# Patient Record
Sex: FEMALE | Race: BLACK OR AFRICAN AMERICAN | Employment: FULL TIME | URBAN - METROPOLITAN AREA
[De-identification: names, ages, dates, MRNs, and addresses within clinical notes are randomized per-mention and may not be internally consistent; named-entity substitution may affect disease eponyms.]

---

## 2019-02-11 ENCOUNTER — HOSPITAL ENCOUNTER (EMERGENCY)
Facility: CLINIC | Age: 49
Discharge: HOME | End: 2019-02-11
Attending: EMERGENCY MEDICINE | Admitting: EMERGENCY MEDICINE

## 2019-02-11 VITALS
WEIGHT: 198 LBS | HEART RATE: 69 BPM | BODY MASS INDEX: 35.08 KG/M2 | TEMPERATURE: 98.7 F | OXYGEN SATURATION: 97 % | RESPIRATION RATE: 16 BRPM | SYSTOLIC BLOOD PRESSURE: 104 MMHG | HEIGHT: 63 IN | DIASTOLIC BLOOD PRESSURE: 61 MMHG

## 2019-02-11 DIAGNOSIS — R51.9 NONINTRACTABLE HEADACHE, UNSPECIFIED CHRONICITY PATTERN, UNSPECIFIED HEADACHE TYPE: ICD-10-CM

## 2019-02-11 PROCEDURE — 96361 HYDRATE IV INFUSION ADD-ON: CPT

## 2019-02-11 PROCEDURE — 25000132 ZZH RX MED GY IP 250 OP 250 PS 637: Performed by: EMERGENCY MEDICINE

## 2019-02-11 PROCEDURE — 25000128 H RX IP 250 OP 636: Performed by: EMERGENCY MEDICINE

## 2019-02-11 PROCEDURE — 96365 THER/PROPH/DIAG IV INF INIT: CPT

## 2019-02-11 PROCEDURE — 96375 TX/PRO/DX INJ NEW DRUG ADDON: CPT

## 2019-02-11 PROCEDURE — 99284 EMERGENCY DEPT VISIT MOD MDM: CPT | Mod: 25

## 2019-02-11 RX ORDER — MAGNESIUM SULFATE HEPTAHYDRATE 40 MG/ML
2 INJECTION, SOLUTION INTRAVENOUS ONCE
Status: COMPLETED | OUTPATIENT
Start: 2019-02-11 | End: 2019-02-11

## 2019-02-11 RX ORDER — DEXAMETHASONE SODIUM PHOSPHATE 10 MG/ML
10 INJECTION, SOLUTION INTRAMUSCULAR; INTRAVENOUS ONCE
Status: COMPLETED | OUTPATIENT
Start: 2019-02-11 | End: 2019-02-11

## 2019-02-11 RX ORDER — MAGNESIUM SULFATE HEPTAHYDRATE 500 MG/ML
2 INJECTION, SOLUTION INTRAMUSCULAR; INTRAVENOUS ONCE
Status: DISCONTINUED | OUTPATIENT
Start: 2019-02-11 | End: 2019-02-11

## 2019-02-11 RX ORDER — KETOROLAC TROMETHAMINE 15 MG/ML
15 INJECTION, SOLUTION INTRAMUSCULAR; INTRAVENOUS ONCE
Status: COMPLETED | OUTPATIENT
Start: 2019-02-11 | End: 2019-02-11

## 2019-02-11 RX ORDER — ACETAMINOPHEN 500 MG
1000 TABLET ORAL ONCE
Status: COMPLETED | OUTPATIENT
Start: 2019-02-11 | End: 2019-02-11

## 2019-02-11 RX ORDER — LEVOTHYROXINE SODIUM 100 UG/1
TABLET ORAL DAILY
COMMUNITY
End: 2019-12-05

## 2019-02-11 RX ORDER — METOCLOPRAMIDE HYDROCHLORIDE 5 MG/ML
10 INJECTION INTRAMUSCULAR; INTRAVENOUS ONCE
Status: COMPLETED | OUTPATIENT
Start: 2019-02-11 | End: 2019-02-11

## 2019-02-11 RX ORDER — DIPHENHYDRAMINE HYDROCHLORIDE 50 MG/ML
25 INJECTION INTRAMUSCULAR; INTRAVENOUS ONCE
Status: COMPLETED | OUTPATIENT
Start: 2019-02-11 | End: 2019-02-11

## 2019-02-11 RX ADMIN — DEXAMETHASONE SODIUM PHOSPHATE 10 MG: 10 INJECTION, SOLUTION INTRAMUSCULAR; INTRAVENOUS at 09:34

## 2019-02-11 RX ADMIN — KETOROLAC TROMETHAMINE 15 MG: 15 INJECTION, SOLUTION INTRAMUSCULAR; INTRAVENOUS at 09:32

## 2019-02-11 RX ADMIN — MAGNESIUM SULFATE HEPTAHYDRATE 2 G: 40 INJECTION, SOLUTION INTRAVENOUS at 09:51

## 2019-02-11 RX ADMIN — METOCLOPRAMIDE 10 MG: 5 INJECTION, SOLUTION INTRAMUSCULAR; INTRAVENOUS at 09:35

## 2019-02-11 RX ADMIN — SODIUM CHLORIDE 1000 ML: 9 INJECTION, SOLUTION INTRAVENOUS at 09:04

## 2019-02-11 RX ADMIN — ACETAMINOPHEN 1000 MG: 500 TABLET, FILM COATED ORAL at 09:37

## 2019-02-11 RX ADMIN — DIPHENHYDRAMINE HYDROCHLORIDE 25 MG: 50 INJECTION, SOLUTION INTRAMUSCULAR; INTRAVENOUS at 09:33

## 2019-02-11 ASSESSMENT — MIFFLIN-ST. JEOR: SCORE: 1502.25

## 2019-02-11 ASSESSMENT — ENCOUNTER SYMPTOMS
NAUSEA: 1
HEADACHES: 1

## 2019-02-11 NOTE — ED AVS SNAPSHOT
Emergency Department  [address] [place] [address]  Phone:  [phone]  Fax:  [phone]                                    Raya Mccormick   MRN: [id number]    Department:   Emergency Department   Date of Visit:  2/11/2019           After Visit Summary Signature Page    I have received my discharge instructions, and my questions have been answered. I have discussed any challenges I see with this plan with the nurse or doctor.    ..........................................................................................................................................  Patient/Patient Representative Signature      ..........................................................................................................................................  Patient Representative Print Name and Relationship to Patient    ..................................................               ................................................  Date                                   Time    ..........................................................................................................................................  Reviewed by Signature/Title    ...................................................              ..............................................  Date                                               Time          22EPIC Rev 08/18

## 2019-02-11 NOTE — ED NOTES
Bed: ED21  Expected date: 2/11/19  Expected time: 8:25 AM  Means of arrival: Ambulance  Comments:  Alejandro 49f migraine ETA 0023

## 2019-02-11 NOTE — ED PROVIDER NOTES
"  History     Chief Complaint:  Headache    HPI   Raya Mccormick is a 49 year old female with a history of diabetes who presents to the emergency department for evaluation of a headache. The patient reports that she has had a headache for two weeks with mild nausea. She tried aspirin with no improvement so she took a dose of Imitrex from a friend and then felt worse which prompted her evaluation to the ED. She has no other concerns.     Allergies:  Metronidazole  Morphine     Medications:    Levothyroxine  Linzess  Metformin    Past Medical History:    Celiac disease  Diabetes  Endometriosis  Depression  PTSD  GERD  Stress incontinence  Thyroid nodule    Past Surgical History:    Cholecystectomy  Colon surgery  Colonoscopy  Hysterectomy  Schatzki's ring    Family History:    Substance abuse    Social History:  Marital Status:   [2]  Negative for tobacco use.  Alcohol use: yes  Negative for drug use.     Review of Systems   Gastrointestinal: Positive for nausea.   Neurological: Positive for headaches.   All other systems reviewed and are negative.      Physical Exam     Patient Vitals for the past 24 hrs:   BP Temp Temp src Pulse Resp SpO2 Height Weight   02/11/19 1200 104/61 -- -- 69 -- 97 % -- --   02/11/19 1120 101/55 -- -- 68 -- 97 % -- --   02/11/19 1052 -- -- -- -- -- 96 % -- --   02/11/19 1020 137/87 -- -- 66 -- 97 % -- --   02/11/19 0952 145/88 -- -- 78 -- 100 % -- --   02/11/19 0852 (!) 142/94 98.7  F (37.1  C) Oral 71 16 97 % 1.6 m (5' 3\") 89.8 kg (198 lb)     Physical Exam  Vitals: reviewed by me  General: Pt seen on [facility], cooperative, and alert to conversation  Eyes: Tracking well, clear conjunctiva BL  ENT: MMM, midline trachea.  Full range of motion to neck.  Lungs:   No tachypnea, no accessory muscle use. No respiratory distress.   CV: Rate as above, regular rhythm.    Abd: Soft, non tender, no guarding, no rebound. Non distended  MSK: no peripheral edema or joint " effusion.  No evidence of trauma  Skin: No rash, normal turgor and temperature  Neuro: Clear speech and no facial droop.  Psych: Not RIS, no e/o AH/      Emergency Department Course   Interventions:  0904 0.9% Sodium Chloride BOLUS 1000 mLs IV   0932 Toradol 15 mg IV  0933 Benadryl 25 mg IV  0934 Decadron 10 mg IV  0935 Reglan 10 mg IV  0937 Tylenol 1000 mg tablet PO  0951 Magnesium sulfate 2 g IV    Emergency Department Course:  0920 Nursing notes and vitals reviewed. I performed an exam of the patient as documented above.     IV inserted. Medicine administered as documented above.     1133 I rechecked the patient and discussed the results of her workup thus far.     Findings and plan explained to the Patient. Patient discharged home with instructions regarding supportive care, medications, and reasons to return. The importance of close follow-up was reviewed.     Impression & Plan    Medical Decision Making:  Raya Mccormick is a 49 year old female who presents to the ED for a headache for two weeks. Non thunder clap in origin, waxing and waning, does seem to be more like a migraine, and she is quite nauseous as well. She tried Imitrex from a friend, this did not help. Here in the ER, she is doing quite well with standard migraine, non narcotic therapy. She has a normal neurologic exam, acting normally per her friend at bedside, and I do think she'll do well in the outpatient setting. Will discharge with very clear return to ED precautions and primary care follow up. No evidence of meningitis, no evidence of glaucoma, or any other evidence of emergent cranial abnormality.     Diagnosis:    ICD-10-CM    1. Nonintractable headache, unspecified chronicity pattern, unspecified headache type R51        Disposition:  Discharged to home    Scribe Disclosure:  KIMBERLYN, Richard Toro, am serving as a scribe on 2/11/2019 at 9:20 AM to personally document services performed by Dr. Hardik MD based on my observations and  the provider's statements to me.     Richard Toro  2/11/2019    EMERGENCY DEPARTMENT       Hill Dye MD  02/11/19 9040

## 2019-02-11 NOTE — LETTER
February 11, 2019      To Whom It May Concern:      Raya Mccormick was seen in our Emergency Department today, 02/11/19.  I expect her condition to improve over the next  2 days.  Her brother was here with her during this visit as she was unable to  herself home.     Sincerely,        Tyra Hernandez RN

## 2019-02-24 ENCOUNTER — HOSPITAL ENCOUNTER (EMERGENCY)
Facility: CLINIC | Age: 49
Discharge: HOME | End: 2019-02-24
Attending: EMERGENCY MEDICINE | Admitting: EMERGENCY MEDICINE

## 2019-02-24 ENCOUNTER — APPOINTMENT (OUTPATIENT)
Dept: CT IMAGING | Facility: CLINIC | Age: 49
End: 2019-02-24
Attending: EMERGENCY MEDICINE

## 2019-02-24 ENCOUNTER — APPOINTMENT (OUTPATIENT)
Dept: GENERAL RADIOLOGY | Facility: CLINIC | Age: 49
End: 2019-02-24
Attending: EMERGENCY MEDICINE

## 2019-02-24 VITALS
BODY MASS INDEX: 35.43 KG/M2 | HEART RATE: 70 BPM | WEIGHT: 200 LBS | DIASTOLIC BLOOD PRESSURE: 83 MMHG | TEMPERATURE: 97.6 F | OXYGEN SATURATION: 99 % | SYSTOLIC BLOOD PRESSURE: 126 MMHG | RESPIRATION RATE: 16 BRPM

## 2019-02-24 DIAGNOSIS — M62.838 SPASM OF MUSCLE: ICD-10-CM

## 2019-02-24 LAB
ALBUMIN SERPL-MCNC: 3.1 G/DL (ref 3.4–5)
ALP SERPL-CCNC: 92 U/L (ref 40–150)
ALT SERPL W P-5'-P-CCNC: 39 U/L (ref 0–50)
ANION GAP SERPL CALCULATED.3IONS-SCNC: 8 MMOL/L (ref 3–14)
AST SERPL W P-5'-P-CCNC: 26 U/L (ref 0–45)
BASOPHILS # BLD AUTO: 0 10E9/L (ref 0–0.2)
BASOPHILS NFR BLD AUTO: 0.2 %
BILIRUB SERPL-MCNC: 0.2 MG/DL (ref 0.2–1.3)
BUN SERPL-MCNC: 11 MG/DL (ref 7–30)
CALCIUM SERPL-MCNC: 8.5 MG/DL (ref 8.5–10.1)
CHLORIDE SERPL-SCNC: 105 MMOL/L (ref 94–109)
CO2 SERPL-SCNC: 30 MMOL/L (ref 20–32)
CREAT SERPL-MCNC: 0.8 MG/DL (ref 0.52–1.04)
DIFFERENTIAL METHOD BLD: NORMAL
EOSINOPHIL # BLD AUTO: 0.2 10E9/L (ref 0–0.7)
EOSINOPHIL NFR BLD AUTO: 2.7 %
ERYTHROCYTE [DISTWIDTH] IN BLOOD BY AUTOMATED COUNT: 14.4 % (ref 10–15)
GFR SERPL CREATININE-BSD FRML MDRD: 88 ML/MIN/{1.73_M2}
GLUCOSE SERPL-MCNC: 91 MG/DL (ref 70–99)
HCT VFR BLD AUTO: 40.1 % (ref 35–47)
HGB BLD-MCNC: 13.9 G/DL (ref 11.7–15.7)
IMM GRANULOCYTES # BLD: 0 10E9/L (ref 0–0.4)
IMM GRANULOCYTES NFR BLD: 0.2 %
LYMPHOCYTES # BLD AUTO: 3 10E9/L (ref 0.8–5.3)
LYMPHOCYTES NFR BLD AUTO: 51.5 %
MCH RBC QN AUTO: 28.3 PG (ref 26.5–33)
MCHC RBC AUTO-ENTMCNC: 34.7 G/DL (ref 31.5–36.5)
MCV RBC AUTO: 82 FL (ref 78–100)
MONOCYTES # BLD AUTO: 0.3 10E9/L (ref 0–1.3)
MONOCYTES NFR BLD AUTO: 5.3 %
NEUTROPHILS # BLD AUTO: 2.3 10E9/L (ref 1.6–8.3)
NEUTROPHILS NFR BLD AUTO: 40.1 %
NRBC # BLD AUTO: 0 10*3/UL
NRBC BLD AUTO-RTO: 0 /100
PLATELET # BLD AUTO: 207 10E9/L (ref 150–450)
POTASSIUM SERPL-SCNC: 4 MMOL/L (ref 3.4–5.3)
PROT SERPL-MCNC: 7 G/DL (ref 6.8–8.8)
RBC # BLD AUTO: 4.92 10E12/L (ref 3.8–5.2)
SODIUM SERPL-SCNC: 143 MMOL/L (ref 133–144)
TROPONIN I SERPL-MCNC: <0.015 UG/L (ref 0–0.04)
WBC # BLD AUTO: 5.8 10E9/L (ref 4–11)

## 2019-02-24 PROCEDURE — 93005 ELECTROCARDIOGRAM TRACING: CPT | Performed by: EMERGENCY MEDICINE

## 2019-02-24 PROCEDURE — 84484 ASSAY OF TROPONIN QUANT: CPT | Performed by: EMERGENCY MEDICINE

## 2019-02-24 PROCEDURE — 25000132 ZZH RX MED GY IP 250 OP 250 PS 637: Performed by: EMERGENCY MEDICINE

## 2019-02-24 PROCEDURE — 70450 CT HEAD/BRAIN W/O DYE: CPT

## 2019-02-24 PROCEDURE — 25000128 H RX IP 250 OP 636: Performed by: EMERGENCY MEDICINE

## 2019-02-24 PROCEDURE — 71045 X-RAY EXAM CHEST 1 VIEW: CPT

## 2019-02-24 PROCEDURE — 96372 THER/PROPH/DIAG INJ SC/IM: CPT | Performed by: EMERGENCY MEDICINE

## 2019-02-24 PROCEDURE — 80053 COMPREHEN METABOLIC PANEL: CPT | Performed by: EMERGENCY MEDICINE

## 2019-02-24 PROCEDURE — 93010 ELECTROCARDIOGRAM REPORT: CPT | Mod: Z6 | Performed by: EMERGENCY MEDICINE

## 2019-02-24 PROCEDURE — 36415 COLL VENOUS BLD VENIPUNCTURE: CPT | Performed by: EMERGENCY MEDICINE

## 2019-02-24 PROCEDURE — 99285 EMERGENCY DEPT VISIT HI MDM: CPT | Mod: 25 | Performed by: EMERGENCY MEDICINE

## 2019-02-24 PROCEDURE — 85025 COMPLETE CBC W/AUTO DIFF WBC: CPT | Performed by: EMERGENCY MEDICINE

## 2019-02-24 RX ORDER — CYCLOBENZAPRINE HCL 10 MG
10 TABLET ORAL ONCE
Status: COMPLETED | OUTPATIENT
Start: 2019-02-24 | End: 2019-02-24

## 2019-02-24 RX ORDER — KETOROLAC TROMETHAMINE 30 MG/ML
30 INJECTION, SOLUTION INTRAMUSCULAR; INTRAVENOUS ONCE
Status: COMPLETED | OUTPATIENT
Start: 2019-02-24 | End: 2019-02-24

## 2019-02-24 RX ORDER — KETOROLAC TROMETHAMINE 30 MG/ML
30 INJECTION, SOLUTION INTRAMUSCULAR; INTRAVENOUS ONCE
Status: DISCONTINUED | OUTPATIENT
Start: 2019-02-24 | End: 2019-02-24

## 2019-02-24 RX ORDER — ACETAMINOPHEN AND CODEINE PHOSPHATE 300; 30 MG/1; MG/1
1-2 TABLET ORAL EVERY 6 HOURS PRN
Qty: 12 TABLET | Refills: 0 | Status: SHIPPED | OUTPATIENT
Start: 2019-02-24 | End: 2019-03-26

## 2019-02-24 RX ADMIN — CYCLOBENZAPRINE HYDROCHLORIDE 10 MG: 10 TABLET, FILM COATED ORAL at 18:37

## 2019-02-24 RX ADMIN — ACETAMINOPHEN AND CODEINE PHOSPHATE 1 TABLET: 300; 30 TABLET ORAL at 19:53

## 2019-02-24 RX ADMIN — KETOROLAC TROMETHAMINE 30 MG: 30 INJECTION, SOLUTION INTRAMUSCULAR at 18:39

## 2019-02-24 ASSESSMENT — ENCOUNTER SYMPTOMS: HEADACHES: 1

## 2019-02-24 NOTE — ED AVS SNAPSHOT
[facility], [place], Emergency Department  [address]  [facility] [address]  Phone:  [phone]  Fax:  [phone]                                    Raya Mccormick   MRN: [id number]    Department:  [facility], Emergency Department   Date of Visit:  2/24/2019           After Visit Summary Signature Page    I have received my discharge instructions, and my questions have been answered. I have discussed any challenges I see with this plan with the nurse or doctor.    ..........................................................................................................................................  Patient/Patient Representative Signature      ..........................................................................................................................................  Patient Representative Print Name and Relationship to Patient    ..................................................               ................................................  Date                                   Time    ..........................................................................................................................................  Reviewed by Signature/Title    ...................................................              ..............................................  Date                                               Time          22EPIC Rev 08/18

## 2019-02-25 LAB — INTERPRETATION ECG - MUSE: NORMAL

## 2019-02-25 NOTE — DISCHARGE INSTRUCTIONS
See your doctor in 2 days.  Return here if chest pain, weakness, numbness, increased headache, visual changes.
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
## 2019-12-05 ENCOUNTER — APPOINTMENT (OUTPATIENT)
Dept: GENERAL RADIOLOGY | Facility: CLINIC | Age: 49
End: 2019-12-05
Attending: EMERGENCY MEDICINE

## 2019-12-05 ENCOUNTER — HOSPITAL ENCOUNTER (OUTPATIENT)
Facility: CLINIC | Age: 49
Setting detail: OBSERVATION
Discharge: HOME | End: 2019-12-06
Attending: EMERGENCY MEDICINE | Admitting: HOSPITALIST

## 2019-12-05 ENCOUNTER — APPOINTMENT (OUTPATIENT)
Dept: CT IMAGING | Facility: CLINIC | Age: 49
End: 2019-12-05
Attending: HOSPITALIST

## 2019-12-05 DIAGNOSIS — R07.1 CHEST PAIN ON BREATHING: ICD-10-CM

## 2019-12-05 DIAGNOSIS — M79.18 MUSCULOSKELETAL PAIN: Primary | ICD-10-CM

## 2019-12-05 DIAGNOSIS — F33.2 SEVERE EPISODE OF RECURRENT MAJOR DEPRESSIVE DISORDER, WITHOUT PSYCHOTIC FEATURES (H): ICD-10-CM

## 2019-12-05 DIAGNOSIS — R09.1 PLEURISY: ICD-10-CM

## 2019-12-05 PROBLEM — R52 PAIN: Status: ACTIVE | Noted: 2019-12-05

## 2019-12-05 LAB
ALBUMIN SERPL-MCNC: 3.6 G/DL (ref 3.4–5)
ALBUMIN UR-MCNC: 10 MG/DL
ALP SERPL-CCNC: 83 U/L (ref 40–150)
ALT SERPL W P-5'-P-CCNC: 30 U/L (ref 0–50)
ANION GAP SERPL CALCULATED.3IONS-SCNC: 2 MMOL/L (ref 3–14)
APPEARANCE UR: CLEAR
AST SERPL W P-5'-P-CCNC: 22 U/L (ref 0–45)
BASOPHILS # BLD AUTO: 0 10E9/L (ref 0–0.2)
BASOPHILS NFR BLD AUTO: 0.2 %
BILIRUB SERPL-MCNC: 0.3 MG/DL (ref 0.2–1.3)
BILIRUB UR QL STRIP: NEGATIVE
BUN SERPL-MCNC: 12 MG/DL (ref 7–30)
CALCIUM SERPL-MCNC: 9.1 MG/DL (ref 8.5–10.1)
CHLORIDE SERPL-SCNC: 105 MMOL/L (ref 94–109)
CO2 SERPL-SCNC: 31 MMOL/L (ref 20–32)
COLOR UR AUTO: YELLOW
CREAT SERPL-MCNC: 0.86 MG/DL (ref 0.52–1.04)
D DIMER PPP FEU-MCNC: 0.3 UG/ML FEU (ref 0–0.5)
DIFFERENTIAL METHOD BLD: NORMAL
EOSINOPHIL # BLD AUTO: 0.2 10E9/L (ref 0–0.7)
EOSINOPHIL NFR BLD AUTO: 2.5 %
ERYTHROCYTE [DISTWIDTH] IN BLOOD BY AUTOMATED COUNT: 13.8 % (ref 10–15)
GFR SERPL CREATININE-BSD FRML MDRD: 80 ML/MIN/{1.73_M2}
GLUCOSE BLDC GLUCOMTR-MCNC: 84 MG/DL (ref 70–99)
GLUCOSE SERPL-MCNC: 96 MG/DL (ref 70–99)
GLUCOSE UR STRIP-MCNC: NEGATIVE MG/DL
HCT VFR BLD AUTO: 40.5 % (ref 35–47)
HGB BLD-MCNC: 14.3 G/DL (ref 11.7–15.7)
HGB UR QL STRIP: NEGATIVE
IMM GRANULOCYTES # BLD: 0 10E9/L (ref 0–0.4)
IMM GRANULOCYTES NFR BLD: 0.2 %
INTERPRETATION ECG - MUSE: NORMAL
KETONES UR STRIP-MCNC: 5 MG/DL
LEUKOCYTE ESTERASE UR QL STRIP: NEGATIVE
LIPASE SERPL-CCNC: 240 U/L (ref 73–393)
LYMPHOCYTES # BLD AUTO: 3 10E9/L (ref 0.8–5.3)
LYMPHOCYTES NFR BLD AUTO: 50 %
MCH RBC QN AUTO: 28.3 PG (ref 26.5–33)
MCHC RBC AUTO-ENTMCNC: 35.3 G/DL (ref 31.5–36.5)
MCV RBC AUTO: 80 FL (ref 78–100)
MONOCYTES # BLD AUTO: 0.4 10E9/L (ref 0–1.3)
MONOCYTES NFR BLD AUTO: 7.1 %
MUCOUS THREADS #/AREA URNS LPF: PRESENT /LPF
NEUTROPHILS # BLD AUTO: 2.4 10E9/L (ref 1.6–8.3)
NEUTROPHILS NFR BLD AUTO: 40 %
NITRATE UR QL: NEGATIVE
NRBC # BLD AUTO: 0 10*3/UL
NRBC BLD AUTO-RTO: 0 /100
PH UR STRIP: 6 PH (ref 5–7)
PLATELET # BLD AUTO: 221 10E9/L (ref 150–450)
POTASSIUM SERPL-SCNC: 4.1 MMOL/L (ref 3.4–5.3)
PROT SERPL-MCNC: 6.9 G/DL (ref 6.8–8.8)
RBC # BLD AUTO: 5.05 10E12/L (ref 3.8–5.2)
RBC #/AREA URNS AUTO: 3 /HPF (ref 0–2)
SODIUM SERPL-SCNC: 138 MMOL/L (ref 133–144)
SOURCE: ABNORMAL
SP GR UR STRIP: 1.03 (ref 1–1.03)
SQUAMOUS #/AREA URNS AUTO: <1 /HPF (ref 0–1)
TROPONIN I SERPL-MCNC: <0.015 UG/L (ref 0–0.04)
TSH SERPL DL<=0.005 MIU/L-ACNC: 2.06 MU/L (ref 0.4–4)
UROBILINOGEN UR STRIP-MCNC: NORMAL MG/DL (ref 0–2)
WBC # BLD AUTO: 5.9 10E9/L (ref 4–11)
WBC #/AREA URNS AUTO: 1 /HPF (ref 0–5)

## 2019-12-05 PROCEDURE — 25000128 H RX IP 250 OP 636: Performed by: PHYSICIAN ASSISTANT

## 2019-12-05 PROCEDURE — 80053 COMPREHEN METABOLIC PANEL: CPT | Performed by: EMERGENCY MEDICINE

## 2019-12-05 PROCEDURE — 36415 COLL VENOUS BLD VENIPUNCTURE: CPT | Performed by: HOSPITALIST

## 2019-12-05 PROCEDURE — 83690 ASSAY OF LIPASE: CPT | Performed by: EMERGENCY MEDICINE

## 2019-12-05 PROCEDURE — 93005 ELECTROCARDIOGRAM TRACING: CPT

## 2019-12-05 PROCEDURE — 85025 COMPLETE CBC W/AUTO DIFF WBC: CPT | Performed by: EMERGENCY MEDICINE

## 2019-12-05 PROCEDURE — 00000146 ZZHCL STATISTIC GLUCOSE BY METER IP

## 2019-12-05 PROCEDURE — 96375 TX/PRO/DX INJ NEW DRUG ADDON: CPT

## 2019-12-05 PROCEDURE — 99220 ZZC INITIAL OBSERVATION CARE,LEVL III: CPT | Performed by: HOSPITALIST

## 2019-12-05 PROCEDURE — 25000128 H RX IP 250 OP 636: Performed by: HOSPITALIST

## 2019-12-05 PROCEDURE — G0378 HOSPITAL OBSERVATION PER HR: HCPCS

## 2019-12-05 PROCEDURE — 84484 ASSAY OF TROPONIN QUANT: CPT | Performed by: EMERGENCY MEDICINE

## 2019-12-05 PROCEDURE — 96361 HYDRATE IV INFUSION ADD-ON: CPT

## 2019-12-05 PROCEDURE — 84443 ASSAY THYROID STIM HORMONE: CPT | Performed by: EMERGENCY MEDICINE

## 2019-12-05 PROCEDURE — 81001 URINALYSIS AUTO W/SCOPE: CPT | Performed by: HOSPITALIST

## 2019-12-05 PROCEDURE — 96374 THER/PROPH/DIAG INJ IV PUSH: CPT

## 2019-12-05 PROCEDURE — 71046 X-RAY EXAM CHEST 2 VIEWS: CPT

## 2019-12-05 PROCEDURE — 96376 TX/PRO/DX INJ SAME DRUG ADON: CPT

## 2019-12-05 PROCEDURE — 25800030 ZZH RX IP 258 OP 636: Performed by: EMERGENCY MEDICINE

## 2019-12-05 PROCEDURE — 25000128 H RX IP 250 OP 636: Performed by: EMERGENCY MEDICINE

## 2019-12-05 PROCEDURE — 36415 COLL VENOUS BLD VENIPUNCTURE: CPT | Performed by: EMERGENCY MEDICINE

## 2019-12-05 PROCEDURE — 74176 CT ABD & PELVIS W/O CONTRAST: CPT

## 2019-12-05 PROCEDURE — 85379 FIBRIN DEGRADATION QUANT: CPT | Performed by: EMERGENCY MEDICINE

## 2019-12-05 PROCEDURE — 25000132 ZZH RX MED GY IP 250 OP 250 PS 637: Performed by: HOSPITALIST

## 2019-12-05 PROCEDURE — 99285 EMERGENCY DEPT VISIT HI MDM: CPT | Mod: 25

## 2019-12-05 PROCEDURE — 84484 ASSAY OF TROPONIN QUANT: CPT | Performed by: HOSPITALIST

## 2019-12-05 RX ORDER — ONDANSETRON 4 MG/1
4 TABLET, ORALLY DISINTEGRATING ORAL EVERY 6 HOURS PRN
Status: DISCONTINUED | OUTPATIENT
Start: 2019-12-05 | End: 2019-12-06 | Stop reason: HOSPADM

## 2019-12-05 RX ORDER — ONDANSETRON 2 MG/ML
4 INJECTION INTRAMUSCULAR; INTRAVENOUS EVERY 30 MIN PRN
Status: DISCONTINUED | OUTPATIENT
Start: 2019-12-05 | End: 2019-12-05

## 2019-12-05 RX ORDER — ACETAMINOPHEN 650 MG/1
650 SUPPOSITORY RECTAL EVERY 4 HOURS PRN
Status: DISCONTINUED | OUTPATIENT
Start: 2019-12-05 | End: 2019-12-06 | Stop reason: HOSPADM

## 2019-12-05 RX ORDER — IBUPROFEN 600 MG/1
600 TABLET, FILM COATED ORAL EVERY 6 HOURS PRN
Status: DISCONTINUED | OUTPATIENT
Start: 2019-12-05 | End: 2019-12-06

## 2019-12-05 RX ORDER — NALOXONE HYDROCHLORIDE 0.4 MG/ML
.1-.4 INJECTION, SOLUTION INTRAMUSCULAR; INTRAVENOUS; SUBCUTANEOUS
Status: DISCONTINUED | OUTPATIENT
Start: 2019-12-05 | End: 2019-12-06 | Stop reason: HOSPADM

## 2019-12-05 RX ORDER — DEXTROSE MONOHYDRATE 25 G/50ML
25-50 INJECTION, SOLUTION INTRAVENOUS
Status: DISCONTINUED | OUTPATIENT
Start: 2019-12-05 | End: 2019-12-06 | Stop reason: HOSPADM

## 2019-12-05 RX ORDER — KETOROLAC TROMETHAMINE 30 MG/ML
30 INJECTION, SOLUTION INTRAMUSCULAR; INTRAVENOUS ONCE
Status: COMPLETED | OUTPATIENT
Start: 2019-12-05 | End: 2019-12-05

## 2019-12-05 RX ORDER — HYDROMORPHONE HYDROCHLORIDE 1 MG/ML
.3-.5 INJECTION, SOLUTION INTRAMUSCULAR; INTRAVENOUS; SUBCUTANEOUS
Status: DISCONTINUED | OUTPATIENT
Start: 2019-12-05 | End: 2019-12-06 | Stop reason: HOSPADM

## 2019-12-05 RX ORDER — METFORMIN HCL 500 MG
1000 TABLET, EXTENDED RELEASE 24 HR ORAL
COMMUNITY

## 2019-12-05 RX ORDER — HYDROMORPHONE HYDROCHLORIDE 1 MG/ML
0.5 INJECTION, SOLUTION INTRAMUSCULAR; INTRAVENOUS; SUBCUTANEOUS ONCE
Status: COMPLETED | OUTPATIENT
Start: 2019-12-05 | End: 2019-12-05

## 2019-12-05 RX ORDER — LIDOCAINE 40 MG/G
CREAM TOPICAL
Status: DISCONTINUED | OUTPATIENT
Start: 2019-12-05 | End: 2019-12-06 | Stop reason: HOSPADM

## 2019-12-05 RX ORDER — IBUPROFEN 600 MG/1
600 TABLET, FILM COATED ORAL EVERY 6 HOURS PRN
Status: DISCONTINUED | OUTPATIENT
Start: 2019-12-05 | End: 2019-12-06 | Stop reason: HOSPADM

## 2019-12-05 RX ORDER — KETOROLAC TROMETHAMINE 30 MG/ML
30 INJECTION, SOLUTION INTRAMUSCULAR; INTRAVENOUS EVERY 6 HOURS PRN
Status: DISCONTINUED | OUTPATIENT
Start: 2019-12-05 | End: 2019-12-06 | Stop reason: HOSPADM

## 2019-12-05 RX ORDER — NICOTINE POLACRILEX 4 MG
15-30 LOZENGE BUCCAL
Status: DISCONTINUED | OUTPATIENT
Start: 2019-12-05 | End: 2019-12-06 | Stop reason: HOSPADM

## 2019-12-05 RX ORDER — ONDANSETRON 2 MG/ML
4 INJECTION INTRAMUSCULAR; INTRAVENOUS EVERY 6 HOURS PRN
Status: DISCONTINUED | OUTPATIENT
Start: 2019-12-05 | End: 2019-12-06 | Stop reason: HOSPADM

## 2019-12-05 RX ORDER — HYDROMORPHONE HYDROCHLORIDE 1 MG/ML
0.5 INJECTION, SOLUTION INTRAMUSCULAR; INTRAVENOUS; SUBCUTANEOUS
Status: COMPLETED | OUTPATIENT
Start: 2019-12-05 | End: 2019-12-05

## 2019-12-05 RX ORDER — ACETAMINOPHEN 325 MG/1
650 TABLET ORAL EVERY 4 HOURS PRN
Status: DISCONTINUED | OUTPATIENT
Start: 2019-12-05 | End: 2019-12-06 | Stop reason: HOSPADM

## 2019-12-05 RX ORDER — HYDROMORPHONE HYDROCHLORIDE 1 MG/ML
0.5 INJECTION, SOLUTION INTRAMUSCULAR; INTRAVENOUS; SUBCUTANEOUS
Status: DISCONTINUED | OUTPATIENT
Start: 2019-12-05 | End: 2019-12-05

## 2019-12-05 RX ADMIN — ACETAMINOPHEN 650 MG: 325 TABLET, FILM COATED ORAL at 15:58

## 2019-12-05 RX ADMIN — HYDROMORPHONE HYDROCHLORIDE 0.5 MG: 1 INJECTION, SOLUTION INTRAMUSCULAR; INTRAVENOUS; SUBCUTANEOUS at 15:58

## 2019-12-05 RX ADMIN — HYDROMORPHONE HYDROCHLORIDE 0.5 MG: 1 INJECTION, SOLUTION INTRAMUSCULAR; INTRAVENOUS; SUBCUTANEOUS at 12:47

## 2019-12-05 RX ADMIN — HYDROMORPHONE HYDROCHLORIDE 0.5 MG: 1 INJECTION, SOLUTION INTRAMUSCULAR; INTRAVENOUS; SUBCUTANEOUS at 21:49

## 2019-12-05 RX ADMIN — SODIUM CHLORIDE 1000 ML: 9 INJECTION, SOLUTION INTRAVENOUS at 10:48

## 2019-12-05 RX ADMIN — HYDROMORPHONE HYDROCHLORIDE 0.5 MG: 1 INJECTION, SOLUTION INTRAMUSCULAR; INTRAVENOUS; SUBCUTANEOUS at 10:00

## 2019-12-05 RX ADMIN — KETOROLAC TROMETHAMINE 30 MG: 30 INJECTION, SOLUTION INTRAMUSCULAR at 11:44

## 2019-12-05 RX ADMIN — ONDANSETRON 4 MG: 2 INJECTION INTRAMUSCULAR; INTRAVENOUS at 10:00

## 2019-12-05 ASSESSMENT — ENCOUNTER SYMPTOMS: SHORTNESS OF BREATH: 1

## 2019-12-05 NOTE — ED NOTES
"[facility]  ED Nurse Handoff Report    ED Chief complaint: Chest Pain (stabbing pain, worsening with movement and palpation, started last evening, 100 fet given IV in rig, 3X4 episode over the last few months)      ED Diagnosis:   Final diagnoses:   None       Code Status: Full Code    Allergies:   Allergies   Allergen Reactions     Gluten      Metronidazole      Morphine Hcl        Activity level - Baseline/Home:  Independent  Activity Level - Current:   Independent    Patient's Preferred language: english   Needed?: No    Isolation: No  Infection: Not Applicable  Bariatric?: No    Vital Signs:   Vitals:    12/05/19 0913   BP: 126/70   Pulse: 57   Resp: 16   Temp: 98.1  F (36.7  C)   TempSrc: Oral   SpO2: 100%   Height: 1.651 m (5' 5\")       Cardiac Rhythm: ,        Pain level:      Is this patient confused?: No   Does this patient have a guardian?  No         If yes, is there guardianship documents in the Epic \"Code/ACP\" activity?  N/A         Guardian Notified?  N/A  [place] - Suicide Severity Rating Scale Completed?  Yes  If yes, what color did the patient score?  White    Patient Report: Initial Complaint: Chest pain  Focused Assessment: The patint developed left sided chest pain that woke her up last night at 0100. She describes the chest pain as a sharp stabbing pain and constant. She states that the chest pain radiates to her left upper back and got worse at 0630. 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 The patient took 2 Aleve at work without significant improvement. She reports feeling short of breath secondary to her chest pain. EMS was called and the patient was given 100 mcg fentanyl en route. The patient states that she had flu-like symptoms a week ago but has not resolved.  Patient was able to slowly ambulate to restroom on her own in the ED.  After 2 doses of IV 0.5mg Dilaudid and 1 IV dose of Toradol patient still is " visibly uncomfortable.    Tests Performed: Results for LOVE BATITSA (MRN [id number]) as of 12/5/2019 12:54   Ref. Range 12/5/2019 09:24 12/5/2019 09:31 12/5/2019 09:37 12/5/2019 11:27   Sodium Latest Ref Range: 133 - 144 mmol/L 138      Potassium Latest Ref Range: 3.4 - 5.3 mmol/L 4.1      Chloride Latest Ref Range: 94 - 109 mmol/L 105      Carbon Dioxide Latest Ref Range: 20 - 32 mmol/L 31      Urea Nitrogen Latest Ref Range: 7 - 30 mg/dL 12      Creatinine Latest Ref Range: 0.52 - 1.04 mg/dL 0.86      GFR Estimate Latest Ref Range: >60 mL/min/1.73_m2 80      GFR Estimate If Black Latest Ref Range: >60 mL/min/1.73_m2 >90      Calcium Latest Ref Range: 8.5 - 10.1 mg/dL 9.1      Anion Gap Latest Ref Range: 3 - 14 mmol/L 2 (L)      Albumin Latest Ref Range: 3.4 - 5.0 g/dL 3.6      Protein Total Latest Ref Range: 6.8 - 8.8 g/dL 6.9      Bilirubin Total Latest Ref Range: 0.2 - 1.3 mg/dL 0.3      Alkaline Phosphatase Latest Ref Range: 40 - 150 U/L 83      ALT Latest Ref Range: 0 - 50 U/L 30      AST Latest Ref Range: 0 - 45 U/L 22      Lipase Latest Ref Range: 73 - 393 U/L 240      Troponin I ES Latest Ref Range: 0.000 - 0.045 ug/L <0.015   <0.015   Glucose Latest Ref Range: 70 - 99 mg/dL 96      WBC Latest Ref Range: 4.0 - 11.0 10e9/L 5.9      Hemoglobin Latest Ref Range: 11.7 - 15.7 g/dL 14.3      Hematocrit Latest Ref Range: 35.0 - 47.0 % 40.5      Platelet Count Latest Ref Range: 150 - 450 10e9/L 221      RBC Count Latest Ref Range: 3.8 - 5.2 10e12/L 5.05      MCV Latest Ref Range: 78 - 100 fl 80      MCH Latest Ref Range: 26.5 - 33.0 pg 28.3      MCHC Latest Ref Range: 31.5 - 36.5 g/dL 35.3      RDW Latest Ref Range: 10.0 - 15.0 % 13.8      Diff Method Unknown Automated Method      % Neutrophils Latest Units: % 40.0      % Lymphocytes Latest Units: % 50.0      % Monocytes Latest Units: % 7.1      % Eosinophils Latest Units: % 2.5      % Basophils Latest Units: % 0.2      % Immature Granulocytes Latest Units: %  0.2      Nucleated RBCs Latest Ref Range: 0 /100 0      Absolute Neutrophil Latest Ref Range: 1.6 - 8.3 10e9/L 2.4      Absolute Lymphocytes Latest Ref Range: 0.8 - 5.3 10e9/L 3.0      Absolute Monocytes Latest Ref Range: 0.0 - 1.3 10e9/L 0.4      Absolute Eosinophils Latest Ref Range: 0.0 - 0.7 10e9/L 0.2      Absolute Basophils Latest Ref Range: 0.0 - 0.2 10e9/L 0.0      Abs Immature Granulocytes Latest Ref Range: 0 - 0.4 10e9/L 0.0      Absolute Nucleated RBC Unknown 0.0      D-Dimer Latest Ref Range: 0.0 - 0.50 ug/ml FEU 0.3      XR CHEST 2 VW Unknown   Rpt    EKG 12-LEAD, TRACING ONLY Unknown  Rpt       Abnormal Results: see epic results tab  Treatments provided: IV pain management, IV fluids, monitoring, comfort measures    Family Comments: son and daughter in law were here in ED but have gone to work    OBS brochure/video discussed/provided to patient/family: Yes              Name of person given brochure if not patient: n/a              Relationship to patient: n/a    ED Medications:   Medications   ondansetron (ZOFRAN) injection 4 mg (4 mg Intravenous Given 12/5/19 1000)   HYDROmorphone (PF) (DILAUDID) injection 0.5 mg (0.5 mg Intravenous Given 12/5/19 1000)   0.9% sodium chloride BOLUS (0 mLs Intravenous Stopped 12/5/19 1202)   ketorolac (TORADOL) injection 30 mg (30 mg Intravenous Given 12/5/19 1144)   HYDROmorphone (PF) (DILAUDID) injection 0.5 mg (0.5 mg Intravenous Given 12/5/19 1247)       Drips infusing?:  No    For the majority of the shift this patient was Green.   Interventions performed were n/a.    Severe Sepsis OR Septic Shock Diagnosis Present: No    To be done/followed up on inpatient unit:  pain management    ED NURSE PHONE NUMBER: *[phone]

## 2019-12-05 NOTE — PROGRESS NOTES
RECEIVING UNIT ED HANDOFF REVIEW    ED Nurse Handoff Report was reviewed by: Fela Herron RN on December 5, 2019 at 1:12 PM

## 2019-12-05 NOTE — ED NOTES
Bed: ED29  Expected date:   Expected time:   Means of arrival:   Comments:  E1  49 F L sided cp  [id number]

## 2019-12-05 NOTE — ED PROVIDER NOTES
History     Chief Complaint:  Chest pain     HPI   Raya Mccormick is a 49 year old female who presents with chest pain. The patient developed left sided chest pain that woke her up last night at 0100. She describes the chest pain as a sharp stabbing pain. She states that the chest pain radiates to her left upper back and got worse at 0630. 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 The patient took 2 Aleve at work without significant improvement. She reports feeling short of breath secondary to her chest pain. EMS was called and the patient was given 100 mcg fentanyl en route. The patient states that she had flu-like symptoms a week ago but has not resolved.     CARDIAC RISK FACTORS:  Sex:    female  Tobacco:   no  Hypertension:   no  Hyperlipidemia:  no  Diabetes:   yes  Family History:  Yes     PE/DVT RISK FACTORS:  Sex:    female  Hormones:   no  Tobacco:   no  Cancer:   no  Travel:   no  Surgery:   no  Other immobilization: no  Personal history:  no  Family history:  no     Allergies:  Metronidazole   Morphine Hcl   nitroimidazoles     Medications:    levothyroxine  linaclotide   Metformin      Past Medical History:    Celiac disease   Constipation   Diabetes mellitus    Endometriosis   GERD   Hematuria   Stress incontinence  Thyroid nodule     Past Surgical History:    Colectomy   Cholecystectomy   Colon surgery   Hysterectomy   Schatzki's ring   Left thyroid lobectomy   Carpal tunnel release   Liposuction     Family History:    Substance abuse     Social History:  The patient was accompanied to the ED by family member.  Smoking Status: Never Smoker  Smokeless Tobacco: Never Used  Alcohol Use: Yes  Drug Use: No  PCP: Magaly Luz   Marital Status:  Single [1]    Review of Systems   Respiratory: Positive for shortness of breath.    Cardiovascular: Positive for chest pain.   All other systems reviewed and are negative.        Physical Exam     Patient Vitals for  "the past 24 hrs:   BP Temp Temp src Pulse Heart Rate Resp SpO2 Height   12/05/19 0913 126/70 98.1  F (36.7  C) Oral 57 57 16 100 % 1.651 m (5' 5\")        Physical Exam  General: Patient is alert and uncomfortable appearing.  HEENT: Head atraumatic    Eyes: pupils equal and reactive. Conjunctiva clear   Nares: patent   Oropharynx: no lesions, uvula midline, no palatal draping, normal voice, no trismus  Neck: Supple without lymphadenopathy, no meningismus  Chest: Heart regular rate and rhythm.   Lungs: Equal clear to auscultation with no wheeze or rales  Abdomen: Left upper abdomen with tenderness to palpation with no rebound or guarding soft, , nondistended, normal bowel sounds  Back: No costovertebral angle tenderness, no midline C, T or L spine tenderness  Neuro: Grossly nonfocal, normal speech, strength equal bilaterally, CN 2-12 intact  Extremities: No deformities, equal radial and DP pulses. No clubbing, cyanosis.  No edema  Skin: Warm and dry with no rash.     Emergency Department Course   ECG:  ECG taken at 0931, ECG read at 0935  Sinus bradycardia  Otherwise normal ECG  Rate 55 bpm. NM interval 148 ms. QRS duration 72 ms. QT/QTc 436/417 ms. P-R-T axes 66 19 25.     Imaging:  Radiology findings were communicated with the patient who voiced understanding of the findings.    XR Chest 2 Views  Final Result  IMPRESSION: No acute cardiopulmonary disease.    REHAN PRICE MD  Reading per radiology     Laboratory:  Laboratory findings were communicated with the patient who voiced understanding of the findings.    Troponin(0924):  <0.015    Troponin(1127):  <0.015    CBC:  WBC 5.9, HGB 14.3, , o/w WNL  D Dimer: 0.3   CMP: anion gap 2 (L) o/w WNL (Creatinine 0.86)   Lipase: 240    Interventions:  1000 zofran 4 mg IV  1000 Dilaudid 0.5 mg IV  1048 0.9% sodium chloride BOLUS 1000 mL IV  1144 toradol 30 mg IV  1247 Dilaudid 0.5 mg IV    Emergency Department Course:  Past medical records, nursing notes, and vitals " reviewed.    1014 I performed an exam of the patient as documented above.    The patient was sent for a chest xray while in the emergency department, results above.      IV was inserted and blood was drawn for laboratory testing, results above.      1028 Patient rechecked and updated.       Findings and plan explained to the Patient who consents to admission. Discussed the patient with Dr. Henderson, who will admit the patient to a observation unit bed for further monitoring, evaluation, and treatment.        Impression & Plan       Medical Decision Making:  Raya Mccormick is a 49 year old female who presents to the emergency department today with left-sided pleuritic chest pain that started last night in the morning around 1 AM.  She states it got worse around 630 now radiating to her chest worse with deep inspiration and movement.  Patient denies specific injury although she does lift heavy objects for her work.  She is hemodynamically stable and not hypertensive.  Broad differential for her pain was considered including aortic dissection, pulmonary embolism, acute coronary syndrome, pneumonia, pneumothorax, pleurisy, chest wall pain, costochondritis among others.  Work-up in the emergency department is unrevealing for a cause of her symptoms yet at this time.  I suspect this is likely pleurisy.  D-dimer was negative making PE unlikely.  Again patient remains hemodynamically stable and oxygenating easily.  There is no evidence of pneumonia or pneumothorax on chest x-ray.  Troponin and delta troponin are negative and her EKG is without acute ischemic changes.  Despite multiple medication interventions in the emergency department patient continues to have fairly severe pain.  I do not feel her symptoms are consistent with aortic dissection at this time and there is no evidence on the chest x-ray or vital signs to suggest this.  She has equal pulses bilaterally as well.  At this point we will plan to admit her to the  observation unit for continued work-up and cardiac rule out.  Patient is agreeable with this plan and all questions and concerns addressed.      Discharge Diagnosis:    ICD-10-CM    1. Chest pain on breathing R07.1    2. Pleurisy R09.1        Disposition:  The patient is admitted into the care of Dr. Henderson.     Scribe Disclosure:  Sahil SOFIA, am serving as a scribe at 10:14 AM on 12/5/2019 to document services personally performed by Tanika Otto MD based on my observations and the provider's statements to me.    12/5/2019    EMERGENCY DEPARTMENT       Tanika Otto MD  12/05/19 1151

## 2019-12-05 NOTE — H&P
[facility]    History and Physical  Hospitalist       Date of Admission:  12/5/2019    Assessment & Plan   Raya Mccormick is a 49 year old female with hypothyroidism and diabetes who presented to the ER chest pain. The etiology of her pain was unclear in the ER. CXR showed no acute cardiopulmonary disease. EKG shows no acute ischemic changes. Troponin negative. The hospitalist service was contacted to bring her into the hospital for further evaluation and management.    Chest/abdomen/back pain  - Pain seems to begin in her left mid back and radiate around to the left underneath her left breast.  - Left sided abdominal tenderness and left flank pain noted on physical exam.  - Labs fairly unremarkable as noted below.  - CXR showed no acute cardiopulmonary disease.  - EKG shows no acute ischemic changes.  - Troponin negative.  - Vitals within normal limits.  - Will follow serial troponins.   - Consider stress test in AM pending serial troponins and other work-up.  - Obtain CT abdomen/pelvis for further evaluation, evaluate for renal stones, other acute intra-abdominal abnormalities.  - As needed pain medications.    Diabetes mellitus  - Hold PTA metformin for now.  - Check blood sugars twice daily, consider adding sliding scale insulin if elevated.    Hypothyroidism  - Prescribed levothyroxine as an outpatient, but has not taken it for the past 6 months or so because she has not been able to afford it.  - Check TSH.    DVT Prophylaxis: Low Risk/Ambulatory with no VTE prophylaxis indicated  Code Status: Full Code  Expected discharge: 1-2 days, recommended to prior living arrangement once pain improved.    Jessee Henderson MD    Primary Care Physician   Magaly Luz    Chief Complaint   Chest/abdominal/back pain    History is obtained from the patient    History of Present Illness   Raya Mccormick is a 49 year old female with a history of hypothyroidism and diabetes mellitus who presented with  chest/back/abdominal pain.  She states that she developed some discomfort in her back around Thanksgiving.  Denies any specific injury or trauma to the area.  The pain then began to wrap around her torso underneath her left breast.  The pain has been getting worse over the last few days.  It woke her up from sleep this morning.  She denies any shortness of breath, but does state it is difficult to take breaths due to the pain.  No fevers, chills, sweats.  Occasional nausea.  Denies any urinary symptoms or diarrhea.    In the ER, vitals were within normal limits.  She is not tachycardic, tachypneic, or hypoxic.  Chest x-ray showed no acute cardiopulmonary disease.  Lab work was fairly unremarkable.  D-dimer within normal limits.  Troponin negative.  EKG did not show any acute ischemic changes.  She was given IV Dilaudid with slight improvement in her symptoms.  The hospitalist service was contacted to bring her into the hospital for further evaluation and management.    When I saw the patient in the ER, she pointed more to her upper abdomen/lower chest when describing the pain.  The pain seems to start in her lower thoracic spine and wraparound to the front.  She had CVA tenderness and left-sided abdominal tenderness.  She appeared to be uncomfortable due to the pain despite receiving IV Dilaudid recently. She has had similar back pain intermittently in the past, but has not had the pain wrapping around to the front previously.    Past Medical History    I have reviewed this patient's medical history and updated it with pertinent information if needed.   Past Medical History:   Diagnosis Date     Celiac disease      Constipation      Diabetes mellitus (H)      Endometriosis      GERD (gastroesophageal reflux disease)      Hematuria      Stress incontinence, female      Thyroid nodule      Past Surgical History   I have reviewed this patient's surgical history and updated it with pertinent information if needed.  Past  Surgical History:   Procedure Laterality Date     ABDOMEN SURGERY       CHOLECYSTECTOMY  2008    H/O benign tumor 2008     COLON SURGERY      surgery in 2008     COLONOSCOPY       Hystorectomy  2008    Uterine fibroids. L oopherectomy 2/08, R salpingo-oopherectomy 10/08. Still has cervix     Schatzki's ring  2008    3/08 EGD s/p dilation     Prior to Admission Medications   Prior to Admission Medications   Prescriptions Last Dose Informant Patient Reported? Taking?   metFORMIN (GLUCOPHAGE-XR) 500 MG 24 hr tablet 12/4/2019 at Unknown time  Yes Yes   Sig: Take 1,000 mg by mouth daily (with dinner)      Facility-Administered Medications: None     Allergies   Allergies   Allergen Reactions     Gluten      Metronidazole      Morphine Hcl      Social History   I have reviewed this patient's social history and updated it with pertinent information if needed. Raya PRESTON Jace  reports that she has never smoked. She has never used smokeless tobacco. She reports current alcohol use. She reports that she does not use drugs.    Family History   I have reviewed this patient's family history and updated it with pertinent information if needed.   Family History   Problem Relation Age of Onset     Substance Abuse Mother      Substance Abuse Father      Substance Abuse Sister      Review of Systems   The 10 point Review of Systems is negative other than noted in the HPI or here.    Physical Exam   Temp: 98.1  F (36.7  C) Temp src: Oral BP: 126/70 Pulse: 57 Heart Rate: 57 Resp: 16 SpO2: 100 % O2 Device: None (Room air)    Vital Signs with Ranges  Temp:  [98.1  F (36.7  C)] 98.1  F (36.7  C)  Pulse:  [57] 57  Heart Rate:  [57] 57  Resp:  [16] 16  BP: (126)/(70) 126/70  SpO2:  [100 %] 100 %  0 lbs 0 oz    Constitutional: awake, alert, cooperative, appears uncomfortable  Eyes: pupils equal, round and reactive to light, conjunctiva normal  ENT: oral pharynx with moist mucous membranes  Respiratory: clear to auscultation bilaterally, no  crackles or wheezing  Cardiovascular: regular rate and rhythm, normal S1 and S2, and no murmur noted  GI: normal bowel sounds, soft, non-distended, abdominal tenderness mostly on the left side, no rebound or guarding, left flank tenderness  Skin: warm, dry, no rashes  Musculoskeletal: no lower extremity pitting edema present  Neurologic: awake, alert, oriented to name, place and time    Data   Data reviewed today:  I personally reviewed the EKG tracing showing sinus rhythm without any acute ischemic changes and the chest x-ray image(s) showing no acute cardiopulmonary disease.    Recent Labs   Lab 12/05/19  1127 12/05/19  0924   WBC  --  5.9   HGB  --  14.3   MCV  --  80   PLT  --  221   NA  --  138   POTASSIUM  --  4.1   CHLORIDE  --  105   CO2  --  31   BUN  --  12   CR  --  0.86   ANIONGAP  --  2*   TIMA  --  9.1   GLC  --  96   ALBUMIN  --  3.6   PROTTOTAL  --  6.9   BILITOTAL  --  0.3   ALKPHOS  --  83   ALT  --  30   AST  --  22   LIPASE  --  240   TROPI <0.015 <0.015     Recent Results (from the past 24 hour(s))   XR Chest 2 Views    Narrative    CHEST TWO VIEWS  12/5/2019 9:37 AM     HISTORY:  Chest pain.    COMPARISON: 2/24/2019.      Impression    IMPRESSION: No acute cardiopulmonary disease.    REHAN PRICE MD

## 2019-12-05 NOTE — PROGRESS NOTES
Observation goals PRIOR TO DISCHARGE    Comments: -diagnostic tests and consults completed and resulted - NOT MET  -vital signs normal or at patient baseline - MET  -adequate pain control on oral analgesics - NOT MET  Nurse to notify provider when observation goals have been met and patient is ready for discharge.

## 2019-12-05 NOTE — PHARMACY-ADMISSION MEDICATION HISTORY
Pharmacy Medication History  Admission medication history interview status for the 12/5/2019  admission is complete. See EPIC admission navigator for prior to admission medications     Medication history sources: Patient and Pharmacy ([facility] and [facility])  Medication history source reliability: Poor  Adherence assessment: Poor    Significant changes made to the medication list:  Dose changes      Additional medication history information:   Patient first stated she takes three meds, metformin, Linzess, and Levothyroxine. I called her pharmacy and they have not filled her meds for some time. I asked her if she used any other pharmacy and she gave me another one. I called that pharmacy and she has not filled Metformin since June . She then stated she cannot afford her meds and has not been taking the levothyroxine and linzess for months. She states she still take the metformin as of yesterday?    Medication reconciliation completed by provider prior to medication history? No    Time spent in this activity: 25 min      Prior to Admission medications    Medication Sig Last Dose Taking? Auth Provider   metFORMIN (GLUCOPHAGE-XR) 500 MG 24 hr tablet Take 1,000 mg by mouth daily (with dinner) 12/4/2019 at Unknown time Yes Unknown, Entered By History

## 2019-12-06 ENCOUNTER — APPOINTMENT (OUTPATIENT)
Dept: NUCLEAR MEDICINE | Facility: CLINIC | Age: 49
End: 2019-12-06
Attending: PHYSICIAN ASSISTANT

## 2019-12-06 VITALS
RESPIRATION RATE: 16 BRPM | SYSTOLIC BLOOD PRESSURE: 108 MMHG | DIASTOLIC BLOOD PRESSURE: 67 MMHG | HEART RATE: 85 BPM | HEIGHT: 65 IN | TEMPERATURE: 96 F | OXYGEN SATURATION: 98 % | BODY MASS INDEX: 33.28 KG/M2

## 2019-12-06 LAB
ANION GAP SERPL CALCULATED.3IONS-SCNC: 6 MMOL/L (ref 3–14)
BASOPHILS # BLD AUTO: 0 10E9/L (ref 0–0.2)
BASOPHILS NFR BLD AUTO: 0.3 %
BUN SERPL-MCNC: 9 MG/DL (ref 7–30)
CALCIUM SERPL-MCNC: 8.4 MG/DL (ref 8.5–10.1)
CHLORIDE SERPL-SCNC: 109 MMOL/L (ref 94–109)
CHOLEST SERPL-MCNC: 179 MG/DL
CO2 SERPL-SCNC: 27 MMOL/L (ref 20–32)
CREAT SERPL-MCNC: 0.78 MG/DL (ref 0.52–1.04)
CV STRESS MAX HR HE: 131
DIFFERENTIAL METHOD BLD: ABNORMAL
EOSINOPHIL # BLD AUTO: 0.2 10E9/L (ref 0–0.7)
EOSINOPHIL NFR BLD AUTO: 4.4 %
ERYTHROCYTE [DISTWIDTH] IN BLOOD BY AUTOMATED COUNT: 13.8 % (ref 10–15)
GFR SERPL CREATININE-BSD FRML MDRD: 89 ML/MIN/{1.73_M2}
GLUCOSE BLDC GLUCOMTR-MCNC: 89 MG/DL (ref 70–99)
GLUCOSE SERPL-MCNC: 89 MG/DL (ref 70–99)
HBA1C MFR BLD: 5.7 % (ref 0–5.6)
HCT VFR BLD AUTO: 39.1 % (ref 35–47)
HDLC SERPL-MCNC: 61 MG/DL
HGB BLD-MCNC: 13.6 G/DL (ref 11.7–15.7)
IMM GRANULOCYTES # BLD: 0 10E9/L (ref 0–0.4)
IMM GRANULOCYTES NFR BLD: 0.3 %
LDLC SERPL CALC-MCNC: 99 MG/DL
LYMPHOCYTES # BLD AUTO: 2 10E9/L (ref 0.8–5.3)
LYMPHOCYTES NFR BLD AUTO: 56.1 %
MCH RBC QN AUTO: 27.8 PG (ref 26.5–33)
MCHC RBC AUTO-ENTMCNC: 34.8 G/DL (ref 31.5–36.5)
MCV RBC AUTO: 80 FL (ref 78–100)
MONOCYTES # BLD AUTO: 0.2 10E9/L (ref 0–1.3)
MONOCYTES NFR BLD AUTO: 5.8 %
NEUTROPHILS # BLD AUTO: 1.2 10E9/L (ref 1.6–8.3)
NEUTROPHILS NFR BLD AUTO: 33.1 %
NONHDLC SERPL-MCNC: 118 MG/DL
NRBC # BLD AUTO: 0 10*3/UL
NRBC BLD AUTO-RTO: 0 /100
NUC STRESS EJECTION FRACTION: 65 %
PLATELET # BLD AUTO: 198 10E9/L (ref 150–450)
POTASSIUM SERPL-SCNC: 4 MMOL/L (ref 3.4–5.3)
RATE PRESSURE PRODUCT: NORMAL
RBC # BLD AUTO: 4.89 10E12/L (ref 3.8–5.2)
SODIUM SERPL-SCNC: 142 MMOL/L (ref 133–144)
STRESS ECHO BASELINE DIASTOLIC HE: 82
STRESS ECHO BASELINE HR: 73
STRESS ECHO BASELINE SYSTOLIC BP: 136
STRESS ECHO CALCULATED PERCENT HR: 76 %
STRESS ECHO LAST STRESS DIASTOLIC BP: 82
STRESS ECHO LAST STRESS SYSTOLIC BP: 126
STRESS ECHO TARGET HR: 173
STRESS/REST PERFUSION RATIO: 1
TRIGL SERPL-MCNC: 93 MG/DL
TROPONIN I SERPL-MCNC: <0.015 UG/L (ref 0–0.04)
WBC # BLD AUTO: 3.6 10E9/L (ref 4–11)

## 2019-12-06 PROCEDURE — 80048 BASIC METABOLIC PNL TOTAL CA: CPT | Performed by: HOSPITALIST

## 2019-12-06 PROCEDURE — G0378 HOSPITAL OBSERVATION PER HR: HCPCS

## 2019-12-06 PROCEDURE — 78452 HT MUSCLE IMAGE SPECT MULT: CPT | Mod: 26 | Performed by: INTERNAL MEDICINE

## 2019-12-06 PROCEDURE — 80061 LIPID PANEL: CPT | Performed by: HOSPITALIST

## 2019-12-06 PROCEDURE — 25000132 ZZH RX MED GY IP 250 OP 250 PS 637: Performed by: HOSPITALIST

## 2019-12-06 PROCEDURE — 25000132 ZZH RX MED GY IP 250 OP 250 PS 637: Performed by: PSYCHIATRY & NEUROLOGY

## 2019-12-06 PROCEDURE — 36415 COLL VENOUS BLD VENIPUNCTURE: CPT | Performed by: HOSPITALIST

## 2019-12-06 PROCEDURE — 78452 HT MUSCLE IMAGE SPECT MULT: CPT

## 2019-12-06 PROCEDURE — 85025 COMPLETE CBC W/AUTO DIFF WBC: CPT | Performed by: HOSPITALIST

## 2019-12-06 PROCEDURE — 25000132 ZZH RX MED GY IP 250 OP 250 PS 637: Performed by: PHYSICIAN ASSISTANT

## 2019-12-06 PROCEDURE — 25000128 H RX IP 250 OP 636: Performed by: INTERNAL MEDICINE

## 2019-12-06 PROCEDURE — 83036 HEMOGLOBIN GLYCOSYLATED A1C: CPT | Performed by: HOSPITALIST

## 2019-12-06 PROCEDURE — 93016 CV STRESS TEST SUPVJ ONLY: CPT | Performed by: INTERNAL MEDICINE

## 2019-12-06 PROCEDURE — 34300033 ZZH RX 343: Performed by: HOSPITALIST

## 2019-12-06 PROCEDURE — 84484 ASSAY OF TROPONIN QUANT: CPT | Performed by: HOSPITALIST

## 2019-12-06 PROCEDURE — 99203 OFFICE O/P NEW LOW 30 MIN: CPT | Performed by: PSYCHIATRY & NEUROLOGY

## 2019-12-06 PROCEDURE — 40000855 ZZH STATISTIC ECHO STRESS OR NM NPI

## 2019-12-06 PROCEDURE — 93018 CV STRESS TEST I&R ONLY: CPT | Performed by: INTERNAL MEDICINE

## 2019-12-06 PROCEDURE — A9502 TC99M TETROFOSMIN: HCPCS | Performed by: HOSPITALIST

## 2019-12-06 PROCEDURE — 99217 ZZC OBSERVATION CARE DISCHARGE: CPT | Performed by: PHYSICIAN ASSISTANT

## 2019-12-06 PROCEDURE — 93017 CV STRESS TEST TRACING ONLY: CPT

## 2019-12-06 PROCEDURE — 00000146 ZZHCL STATISTIC GLUCOSE BY METER IP

## 2019-12-06 RX ORDER — LIDOCAINE 4 G/G
1 PATCH TOPICAL EVERY 24 HOURS
Qty: 30 PATCH | Refills: 0 | Status: SHIPPED | OUTPATIENT
Start: 2019-12-06

## 2019-12-06 RX ORDER — ALBUTEROL SULFATE 90 UG/1
2 AEROSOL, METERED RESPIRATORY (INHALATION) EVERY 5 MIN PRN
Status: DISCONTINUED | OUTPATIENT
Start: 2019-12-06 | End: 2019-12-06

## 2019-12-06 RX ORDER — ACETAMINOPHEN 325 MG/1
650 TABLET ORAL EVERY 4 HOURS PRN
COMMUNITY
Start: 2019-12-06

## 2019-12-06 RX ORDER — QUETIAPINE FUMARATE 25 MG/1
25 TABLET, FILM COATED ORAL AT BEDTIME
Status: DISCONTINUED | OUTPATIENT
Start: 2019-12-06 | End: 2019-12-06 | Stop reason: HOSPADM

## 2019-12-06 RX ORDER — REGADENOSON 0.08 MG/ML
0.4 INJECTION, SOLUTION INTRAVENOUS ONCE
Status: COMPLETED | OUTPATIENT
Start: 2019-12-06 | End: 2019-12-06

## 2019-12-06 RX ORDER — IBUPROFEN 600 MG/1
600 TABLET, FILM COATED ORAL EVERY 6 HOURS PRN
COMMUNITY
Start: 2019-12-06

## 2019-12-06 RX ORDER — AMINOPHYLLINE 25 MG/ML
50-100 INJECTION, SOLUTION INTRAVENOUS
Status: DISCONTINUED | OUTPATIENT
Start: 2019-12-06 | End: 2019-12-06

## 2019-12-06 RX ORDER — ACYCLOVIR 200 MG/1
0-1 CAPSULE ORAL
Status: DISCONTINUED | OUTPATIENT
Start: 2019-12-06 | End: 2019-12-06 | Stop reason: HOSPADM

## 2019-12-06 RX ORDER — QUETIAPINE FUMARATE 25 MG/1
25 TABLET, FILM COATED ORAL AT BEDTIME
Qty: 30 TABLET | Refills: 0 | Status: SHIPPED | OUTPATIENT
Start: 2019-12-06

## 2019-12-06 RX ORDER — LIDOCAINE 4 G/G
1 PATCH TOPICAL
Status: DISCONTINUED | OUTPATIENT
Start: 2019-12-06 | End: 2019-12-06 | Stop reason: HOSPADM

## 2019-12-06 RX ADMIN — TETROFOSMIN 32.2 MCI.: 1.38 INJECTION, POWDER, LYOPHILIZED, FOR SOLUTION INTRAVENOUS at 11:22

## 2019-12-06 RX ADMIN — ACETAMINOPHEN 650 MG: 325 TABLET, FILM COATED ORAL at 12:01

## 2019-12-06 RX ADMIN — TETROFOSMIN 10.5 MCI.: 1.38 INJECTION, POWDER, LYOPHILIZED, FOR SOLUTION INTRAVENOUS at 09:08

## 2019-12-06 RX ADMIN — FLUOXETINE 20 MG: 20 CAPSULE ORAL at 12:01

## 2019-12-06 RX ADMIN — LIDOCAINE 1 PATCH: 560 PATCH PERCUTANEOUS; TOPICAL; TRANSDERMAL at 08:57

## 2019-12-06 RX ADMIN — REGADENOSON 0.4 MG: 0.08 INJECTION, SOLUTION INTRAVENOUS at 11:22

## 2019-12-06 NOTE — PROGRESS NOTES
Care Suites Procedure Nursing Note    Lexiscan Stress:   Procedure started time: 1122  Procedure completed time: 1129      Pt tolerated well. VSS. Pt C/O left sided chest pain under left breast prior to injection. After injection C/O SOB and HA.  No changes in chest discomfort.  SOB resolved.  Continues to C/O HA.  Encouraged to eat and drink a beverage with caffeine when back to room.     1131 Pt transferred back to  17 on Observation unit per w/c.

## 2019-12-06 NOTE — CONSULTS
Consult Date:  12/06/2019      PSYCHIATRIC CONSULTATION       REASON FOR CONSULTATION:  Worsening depression.      REQUESTING CLINICIAN:  Jessee Henderson MD      CHIEF COMPLAINT:  Depression.      HISTORY OF PRESENT ILLNESS:  Raya Mccormick is a 49-year-old female with a psychiatric history of depression, anxiety, and 1 distant suicide attempt by way of overdose on sleeping pills 25 years ago with coinciding psychiatric hospitalization.  The patient is currently hospitalized on the observation unit for workup of chest, abdomen, and back pain.  She has undergone laboratory workup overlaid with relevant imaging and thus far, no source of her pain has been identified.  She is slotted for a cardiac stress test later today.  The patient conceded to worsening depression to the admitting providers; and thus, our service was consulted.      On my interview today, the patient does concede to worsening depression.  She notes that she has struggled variably with depression and anxiety throughout her life and as noted above, had a suicide attempt 25 years ago by way of diet pill overdose.  She tells me that suicidality has never returned, and she does not experience those sorts of nihilistic thoughts at present but notes that due to a variety of life stressors, her depression has grown more prominent over the past several months.  She was seeing a therapist regularly until about a year ago, and she thought she was doing well, but then she states that she tends to focus all her energy on her children and grandchildren and feels emotionally depleted because of that.  She has 3 adult children and states that their problems affect her.  Their problems include financial issues, as well as having too many children that they are unable to care for.  One of her daughters has kids and the other has 5 and cannot really take care of them.  In fact, she has taken custody of one of her grandchildren who is now 8 years old and has had that  child for 5 years.  This is due to her daughter having her own mental health issues, and the patient describes her daughter as just being unstable in general.  Symptomatically, the patient reports a lot of fatigue and low energy throughout the day, coupled with amotivation and anhedonia.  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  She does note that her job as a cook allows her to bring home a meal, which is what she provides her grandchild with whom she lives.  In contrast to this, she notes that when she tries to go to sleep at night, the anxiety, racing thoughts and ruminating on her various stressors impose poor sleep.  In response to this, she feels fidgety, irritated and the poor sleep aids too fatigued throughout the day, and the cycle perpetuates itself.  As noted above, she denies any thoughts of harm to herself or others.  She has no psychotic symptoms.  She denies any symptoms consistent with a bipolar diathesis.      The patient reports that she has trialed many medications over the past 25 years but does not have good recollection of those trials other than to say that the bulk of them were met with feeling a bit blunted on the medication.  She does note, however, her most recent medication, which she took about a year ago, she does recall as being helpful.  After reviewing various medications, she tells me it was Prozac.  She does not recall the dose.  We discussed reinitiating Prozac given historic benefit and making available an evening dose of quetiapine to assist with sleep and anxiety.  Pending her response to this medication, it could be utilized in as-needed fashion for anxiety throughout the day as well.      PAST PSYCHIATRIC HISTORY:  As per HPI.      PAST MEDICAL HISTORY:  Celiac disease, constipation, diabetes mellitus, endometriosis, GERD, hematuria,  stress incontinence, thyroid nodule, hypothyroidism.      FAMILY HISTORY:  Daughter with depression.      SOCIAL HISTORY:  The patient is single.  She has custody of her 8-year-old grandchild as she notes her daughter has mental health issues.  She has 3 adult children and multiple grandchildren.  She works as a cook.      REVIEW OF SYSTEMS:  Ten-point review of systems reviewed on admitting H and P by Dr. Henderson.      ALLERGIES:  GLUTEN, METRONIDAZOLE, MORPHINE.      PRIOR TO ADMISSION MEDICATIONS:  Metformin 1000 mg with dinner.      MENTAL STATUS EXAMINATION:  Age-appearing female with situationally appropriate grooming and hygiene.  Calm and cooperative with good eye contact.  Awake, alert and globally oriented.  Cooperative, forthcoming, and a seemingly reliable historian.  Mood described as depressed and anxious.  Affect was stable, placid, and pleasant.  Speech is fluent, spontaneous, clear, and nonpressured.  Thoughts were linear, logical and goal directed.  No observation of delusional content.  No observation of response to internal stimuli.  No fluctuation in cognition appreciated.  Intelligence estimated as average.  Memory intact to recent and distant events.  Attendance and concentration are well maintained.  Muscle strength and tone were not assessed.  Coordination, station and gait were not assessed.  Insight and judgment are preserved.  Denies suicidal or homicidal ideation, intent or plan.      VITAL SIGNS:  Temperature 96.3, heart rate 65, respirations 16, blood pressure 96/58, oxygen saturation 96% on room air.      LABORATORY REVIEW:  Sodium 142, potassium 4, creatinine 0.78.  Troponin less than 0.015.  TSH 2.06.  Glucose 89.  Hemoglobin A1c 5.7.  White count 3.6, hemoglobin 13.6, platelets 198.      DIAGNOSES:   1.  Major depressive disorder, mild to moderate.   2.  Unspecified anxiety disorder.      IMPRESSION:  Raya Mccormick is a 49-year-old female with psychiatric history of depression,  anxiety and a distant suicide attempt 25 years ago who is currently hospitalized on the observation unit for management of chest, abdomen, and back pain.  She is currently in the midst of a workup, though no cardiac source of this pain has been yet identified.  She is slotted to get a cardiac stress test later today.  The patient was last on psychiatric medication about a year ago and notes that her most recent medication, Prozac, was the only one she recalls proving effective.  She does not recall the dose.  As reviewed with the patient, we are going to resume Prozac and make available an evening dose of Seroquel as well to target pronounced anxiety, racing thoughts, irritability and poor sleep in the evening.  We reviewed side effect profiles of both these medications, and she was agreeable to starting them.  If she is in the hospital until tomorrow, I would ask that reconsult Psychiatry so we can see how her Seroquel was tolerated.  Otherwise, would recommend close followup with her primary care doctor to assess and continue medication titration.      PLAN:   1.  Begin Prozac 20 mg daily.   2.  Begin Seroquel 25 mg at bedtime.   3.  The patient does not require psychiatric hospitalization; disposition per primary service.   4.  If the patient is still in the hospital tomorrow, reconsult Psychiatry.  If not, recommend she follow closely with her primary care doctor for medication monitoring.         YADI POTTS DO             D: 2019   T: 2019   MT: MCKENNA      Name:     LOVE BATISTA   MRN:      [id number]        Account:       [id number]   :      1970           Consult Date:  2019      Document: [id number]       cc: Jessee Henderson MD

## 2019-12-06 NOTE — PLAN OF CARE
A&Ox4. Up independently. Regular diet; BG 89. VSS on RA. C/o 7/10 L back pain radiating to under L breast, lidocaine patch applied. C/o headache post NM stress test, given tylenol prn. Trop (-) x3. NM mariela completed, results are in- PA aware. Tele NSR. Psych consult complete.

## 2019-12-06 NOTE — PROGRESS NOTES
Discharge criteria met. Reviewed discharge instructions with pt. Pt verbalized understanding. All questions answered. Rx filled and given to pt. PIV removed. Pt left the unit with staff member in stable condition.

## 2019-12-06 NOTE — PLAN OF CARE
Observation goals PRIOR TO DISCHARGE     Comments: -diagnostic tests and consults completed and resulted-not met   -vital signs normal or at patient baseline-met  -adequate pain control on oral analgesics -met  Nurse to notify provider when observation goals have been met and patient is ready for discharge.

## 2019-12-06 NOTE — PROGRESS NOTES
PRIMARY DIAGNOSIS: CHEST PAIN  OUTPATIENT/OBSERVATION GOALS TO BE MET BEFORE DISCHARGE:  1. Ruled out acute coronary syndrome (negative or stable Troponin):  Yes  2. Pain Status: Improved with use of alternative comfort measures i.e.: ice and lidocaine patch  3. Appropriate provocative testing performed: Partially met  - Stress Test Procedure: Nuclear  - Interpretation of cardiac rhythm per telemetry tech: NSR    4. Cleared by Consultants (if applicable):Yes  5. Return to near baseline physical activity: Yes  Discharge Planner Nurse   Safe discharge environment identified: No  Barriers to discharge: Yes       Entered by: Delia Bryan 12/06/2019      Please review provider order for any additional goals.   Nurse to notify provider when observation goals have been met and patient is ready for discharge.

## 2019-12-06 NOTE — CONSULTS
Patient seen for initial psychiatric consultation. Started on Prozac and Seroquel. No psych hospitalization warranted. Refer to dictated note.    Yfn Thomas DO

## 2019-12-06 NOTE — PROGRESS NOTES
Observation goals PRIOR TO DISCHARGE     Comments: -diagnostic tests and consults completed and resulted - met    -vital signs normal or at patient baseline - met   -tolerating oral intake to maintain hydration - met    -returns to baseline functional status - met    -safe disposition plan has been identified

## 2019-12-06 NOTE — PROGRESS NOTES
END OF SHIFT REPORT :  DATE & TIME: December 5, 2019     SHIFT:  3- PM-11 PM     Raya Mccormick [id number] 49 year old female  DATE OF ADMISSION:  12/5/2019  DUE TO Chest Pain (stabbing pain, worsening with movement and palpation, started last evening, 100 fet given IV in rig, 3X4 episode over the last few months)    CODE STATUS :Full Code. ISOLATION:No. : No.     VITAL SIGNS:  Temp: 95.7  F (35.4  C) BP: 109/74 Pulse: 57 Heart Rate: 76 Resp: 16 ,   SpO2: 98 %,O2 Device: None (Room air)     ORIENTATION: Alert and Oriented x 4  BEHAVIOR & AGGRESSION TOOL COLOR: Green  CIWA SCORE: N/A  ACTIVITY LEVEL: Independent  DIET: Clear liquid  BOWEL/BLADDER: Continent Up to the bathroom  ABNORMAL LAB none CT Troponin Normal x 2  DRAIN/DEVICES: No.  IV ACCESS: SL  TELEMETRY RHYTHM: Yes. SR  SKIN: intact  PAIN MANAGMENT: Dilaudid IV and Tylenol PO    CONSULT/S: none  OTHER IMPORTANT INFO:  GOALS/PLAN: Possible stress test in AM; Blood works in AM incldg Hgb A1C

## 2019-12-06 NOTE — PROGRESS NOTES
A&Ox4. VSS on RA. Tele-SR. C/O of pain 6/10 under left breast that radiates to back. Ice pack given, patient asleep upon reassessment. Reports CORNELL, denies dizziness. Up SBA to BR. Troponin negative x2.  NPO for possible stress test today.

## 2019-12-06 NOTE — DISCHARGE SUMMARY
[facility]  Hospitalist Discharge Summary       Date of Admission:  12/5/2019  Date of Discharge:  12/6/2019  Discharging Provider: Sommer Sparks PA-C    Discharge Diagnoses   Atypical chest pain, likely musculoskeletal     Follow-ups Needed After Discharge   Follow-up Appointments     Follow-up and recommended labs and tests       Follow up with primary care provider, Magaly Luz, within 7 days for   hospital follow-up.  No follow up labs or test are needed.           Unresulted Labs Ordered in the Past 30 Days of this Admission     No orders found for last 31 day(s).      These results will be followed up by PCP    Discharge Disposition   Discharged to home  Condition at discharge: Stable    Hospital Course   Raya Mccormick is a 49 year old female with hypothyroidism and diabetes who presented to the ER chest pain. Registered to observation for further evaluation and treatment. See H&P by Dr. Henderson for complete details on presentation.      Atypical chest pain: Reports acute onset of paraspinous back pain in thoracic region with radiation under left breast the AM of admission. Sx awoke her from sleep. Reports pleuritic component and some lightheadedness. No N/V. No recent illness with URI sx. Hx of GERD, but dissimilar feeling. 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 CMP, lipase, CBC, TSH unremarkable. Lipid profile 179/99/61/93. A1C 5.7. D-dimer 0.3. UA unremarkable. CXR unremarkable for acute pathology. CT C/A/P negative for acute process. No recent change in exercise tolerance. Risk factors for CAD include premature family hx of CAD and diabetes. No prior stress testing. No HTN, hyperlipidemia, tobacco use.   - Lexiscan performed the AM of admission and negative for inducible ischemia.   - Given the above negative work-up, likely musculoskeletal as sx are reproducible upon palpation  - Analgesic regimen with PRN tylenol,  ibuprofen, lidoderm patch   - Close follow-up with PCP in the next week to discuss hospitalization and re-evaluate sx      Depression  Anxiety: Reports worsening depression in the past month. Not currently on any medications for this, previously on Cymbalta. Not followed by Psychiatry or therapist outpatient.   -- Psych consulted, see formal note by Dr. Thomas. Plan to start Prozac 20 mg po every day, Seroquel 25 mg at bedtime. Close follow-up with PCP recommended. Rx sent to [facility] pharmacy at discharge      T2DM, non-insulin dependent, controlled:  A1C 5.7.   - Hold PTA metformin, resume at discharge   - Check blood sugars twice daily, consider adding sliding scale insulin if elevated.     S/P left thyroid lobectomy (1/2017)   Hypothyroidism: TSH 2.06.   - Prescribed levothyroxine as an outpatient, but has not taken it for the past 6 months or so because she has not been able to afford it, given TSH WNL, will hold off on re-initiation. Defer ongoing management to PCP      Carcinoid tumor of the colon S/P hemicolectomy   Chronic constipation: Previously on Linzess, but not currently taking due to cost.     Consultations This Hospital Stay   PSYCHIATRY IP CONSULT    Code Status   Full Code    Time Spent on this Encounter   I, Sommer Sparks PA-C, personally saw the patient today and spent greater than 30 minutes discharging this patient.       Sommer Sparks PA-C  [facility]  ______________________________________________________________________    Physical Exam   Vital Signs: Temp: 96  F (35.6  C) Temp src: Oral BP: 108/67 Pulse: 85 Heart Rate: 86 Resp: 16 SpO2: 98 % O2 Device: None (Room air)    Weight: 0 lbs 0 oz    See my progress note from earlier today for physical exam.        Primary Care Physician   Magaly Luz    Discharge Orders      Reason for your hospital stay    You were hospitalized for further evaluation of chest pain.     Follow-up and  recommended labs and tests     Follow up with primary care provider, Magaly Luz, within 7 days for hospital follow-up.  No follow up labs or test are needed.     Activity    Your activity upon discharge: activity as tolerated     Discharge Instructions    1) Follow-up with your primary care provider to discuss hospitalizaiton   2) Pain control with tylenol, ibuprofen, ice/heat, lidoderm patches   3) Seroquel and Prozac started by Psychiatry, continue at discharge, follow-up with your PCP for further dose adjustment     Full Code     Diet    Follow this diet upon discharge: Orders Placed This Encounter      Regular Diet Adult       Significant Results and Procedures   Most Recent 3 CBC's:  Recent Labs   Lab Test 12/06/19  0633 12/05/19  0924 02/24/19  1849   WBC 3.6* 5.9 5.8   HGB 13.6 14.3 13.9   MCV 80 80 82    221 207     Most Recent 3 BMP's:  Recent Labs   Lab Test 12/06/19  0633 12/05/19  0924 02/24/19  1849    138 143   POTASSIUM 4.0 4.1 4.0   CHLORIDE 109 105 105   CO2 27 31 30   BUN 9 12 11   CR 0.78 0.86 0.80   ANIONGAP 6 2* 8   TIMA 8.4* 9.1 8.5   GLC 89 96 91     Most Recent 2 LFT's:  Recent Labs   Lab Test 12/05/19  0924 02/24/19  1849   AST 22 26   ALT 30 39   ALKPHOS 83 92   BILITOTAL 0.3 0.2   ,   Results for orders placed or performed during the hospital encounter of 12/05/19   XR Chest 2 Views    Narrative    CHEST TWO VIEWS  12/5/2019 9:37 AM     HISTORY:  Chest pain.    COMPARISON: 2/24/2019.      Impression    IMPRESSION: No acute cardiopulmonary disease.    REHAN PRICE MD   CT Abdomen Pelvis w/o Contrast    Narrative    CT ABDOMEN PELVIS WITHOUT CONTRAST   12/5/2019 7:15 PM     HISTORY: Abdominal pain, acute, generalized.    TECHNIQUE: Noncontrast CT abdomen and pelvis was performed. Radiation  dose for this scan was reduced using automated exposure control,  adjustment of the mA and/or kV according to patient size, or iterative  reconstruction technique.    COMPARISON: CT abdomen  and pelvis on 12/5/2005.    FINDINGS:   Lung bases: Minimal bibasilar atelectasis.    Abdomen/pelvis: Limited evaluation of the abdominal organs due to lack  of IV contrast. The unenhanced liver, gallbladder, spleen, adrenal  glands, pancreas and kidneys are unremarkable.    Postsurgical changes of partial colectomy. No abnormally dilated bowel  loops. No significant free fluid in the abdomen or pelvis. No free  peritoneal or portal venous gas.    Bones and soft tissues: No suspicious osseous lesion. Mild diffuse fat  stranding of the subcutaneous tissue of the abdomen, of indeterminate  etiology.      Impression    IMPRESSION: No acute pathology in the abdomen or pelvis.     DEMARIO CARRENO MD   NM Lexiscan stress test (nuc card)     Value    Target     Baseline Systolic     Baseline Diastolic BP 82    Last Stress Systolic     Last Stress Diastolic BP 82    Baseline HR 73    Max     Calculated Percent HR 76    Rate Pressure Product 16,506.0    Left Ventricular EF 65    Stress/rest perfusion ratio 1.00    Narrative       The nuclear stress test is negative for inducible myocardial ischemia   or infarction.     Nuclear Study Quality: Small apical defect most consistent with apical   thinning.     The left ventricular ejection fraction at stress is 65%.     There is no prior study for comparison.           Discharge Medications   Current Discharge Medication List      START taking these medications    Details   acetaminophen (TYLENOL) 325 MG tablet Take 2 tablets (650 mg) by mouth every 4 hours as needed for mild pain    Associated Diagnoses: Musculoskeletal pain      FLUoxetine (PROZAC) 20 MG capsule Take 1 capsule (20 mg) by mouth daily  Qty: 30 capsule, Refills: 0    Comments: Future refills by PCP Dr. Magaly Luz with phone number [phone].  Associated Diagnoses: Severe episode of recurrent major depressive disorder, without psychotic features (H)      ibuprofen (ADVIL/MOTRIN) 600  MG tablet Take 1 tablet (600 mg) by mouth every 6 hours as needed for moderate pain    Associated Diagnoses: Musculoskeletal pain      Lidocaine (LIDOCARE) 4 % Patch Place 1 patch onto the skin every 24 hours To prevent lidocaine toxicity, patient should be patch free for 12 hrs daily.    On for 12 hours, off for 12 hours. OK to use generic.  Qty: 30 patch, Refills: 0    Associated Diagnoses: Musculoskeletal pain      QUEtiapine (SEROQUEL) 25 MG tablet Take 1 tablet (25 mg) by mouth At Bedtime  Qty: 30 tablet, Refills: 0    Comments: Future refills by PCP Dr. Magaly Luz with phone number [phone].  Associated Diagnoses: Severe episode of recurrent major depressive disorder, without psychotic features (H)         CONTINUE these medications which have NOT CHANGED    Details   metFORMIN (GLUCOPHAGE-XR) 500 MG 24 hr tablet Take 1,000 mg by mouth daily (with dinner)           Allergies   Allergies   Allergen Reactions     Gluten      Metronidazole      Morphine Hcl

## 2019-12-06 NOTE — PROGRESS NOTES
Observation goals PRIOR TO DISCHARGE    Comments: -diagnostic tests and consults completed and resulted - MET  -vital signs normal or at patient baseline - MET  -adequate pain control on oral analgesics - NOT MET  Nurse to notify provider when observation goals have been met and patient is ready for discharge.

## 2019-12-06 NOTE — PROGRESS NOTES
[facility]    Hospitalist Progress Note    Date of Service (when I saw the patient): 12/06/2019    Assessment & Plan   Raya Mccormick is a 49 year old female with hypothyroidism and diabetes who presented to the ER chest pain. The etiology of her pain was unclear in the ER. CXR showed no acute cardiopulmonary disease. EKG shows no acute ischemic changes. Troponin negative. The hospitalist service was contacted to bring her into the hospital for further evaluation and management.     Atypical chest pain: Reports acute onset of paraspinous back pain in thoracic region with radiation under left breast the AM of admission. Sx awoke her from sleep. Reports pleuritic component and some lightheadedness. No N/V. No recent illness with URI sx. Hx of GERD, but dissimilar feeling. 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 CMP, lipase, CBC, TSH unremarkable. Lipid profile 179/99/61/93. A1C 5.7. D-dimer 0.3. UA unremarkable. CXR unremarkable for acute pathology. CT C/A/P negative for acute process. No recent change in exercise tolerance. Risk factors for CAD include premature family hx of CAD and diabetes. No prior stress testing. No HTN, hyperlipidemia, tobacco use.   - Lexiscan this AM, if negative, likely musculoskeletal as sx are reproducible upon palpation  - Analgesic regimen with PRN tylenol, ibuprofen, lidoderm patch     Depression  Anxiety: Reports worsening depression in the past month. Not currently on any medications for this, previously on Cymbalta. Not followed by Psychiatry or therapist outpatient.   -- Psych consulted, see formal note by Dr. Thomas. Plan to start Prozac 20 mg po every day, Seroquel 25 mg at bedtime. Close follow-up with PCP recommended.      T2DM, non-insulin dependent, controlled:  A1C 5.7.   - Hold PTA metformin, resume at discharge   - Check blood sugars twice daily, consider adding sliding scale insulin if elevated.     S/P left  thyroid lobectomy (1/2017)   Hypothyroidism: TSH 2.06.   - Prescribed levothyroxine as an outpatient, but has not taken it for the past 6 months or so because she has not been able to afford it, given TSH WNL, will hold off on re-initiation. Defer ongoing management to PCP     Carcinoid tumor of the colon S/P hemicolectomy   Chronic constipation: Previously on Linzess, but not currently taking due to cost.      DVT Prophylaxis: Ambulate  Code Status: Full Code    Disposition: Expected discharge later today, pending Lexiscan results.     Sommer Sparks PA-C    Interval History   Ongoing left back pain radiating to under left breast. Pleuritic. Reproducible upon palpation. Pt reports recent increased heavy lifting at work (works as a ).     -Data reviewed today: I reviewed all new labs and imaging results over the last 24 hours.EKG without evidence of ischemia    Physical Exam   Temp: 96.3  F (35.7  C) Temp src: Oral BP: 136/82   Heart Rate: 65 Resp: 16 SpO2: 96 % O2 Device: None (Room air)    There were no vitals filed for this visit.  Vital Signs with Ranges  Temp:  [95.7  F (35.4  C)-97.2  F (36.2  C)] 96.3  F (35.7  C)  Heart Rate:  [63-76] 65  Resp:  [16-18] 16  BP: ()/(48-82) 136/82  SpO2:  [95 %-98 %] 96 %  No intake/output data recorded.    CONSTITUTIONAL: Pt laying in bed, dressed in hospital garb. Appears comfortable. Cooperative with interview.  HEENT: Normocephalic, atraumatic. Negative for conjunctival redness or scleral icterus.   CARDIOVASCULAR: RRR, no murmurs, rubs, or extra heart sounds appreciated. Pulses +2/4 and regular in upper and lower extremities, bilaterally.   RESPIRATORY: No increased work of breathing.  CTA, bilat; no wheezes, rales, or rhonchi appreciated.  GASTROINTESTINAL:  Abdomen soft, non-distended. BS auscultated in all four quadrants. Negative for tenderness to palpation.  No masses or organomegaly noted.  MUSCULOSKELETAL: Reproducible pain upon  palpation of paraspinous region, left thoracic. No gross deformities noted. Normal muscle tone.   HEMATOLOGIC/LYMPHATIC/IMMUNOLOGIC: Negative for lower extremity edema, bilaterally.  NEUROLOGIC: Alert and oriented to person, place, and time.  strength intact. No focal neuro deficits.   SKIN: Warm, dry, intact. No jaundice noted. Negative for suspicious lesions, rashes, bruising, open sores or abrasions.     Medications       FLUoxetine  20 mg Oral Daily     lidocaine  1 patch Transdermal Q24H     lidocaine   Transdermal Q8H     lidocaine   Transdermal Q24h     QUEtiapine  25 mg Oral At Bedtime     sodium chloride (PF)  3 mL Intracatheter Q8H       Data   Recent Labs   Lab 12/06/19  0633 12/05/19 2001 12/05/19  1127 12/05/19  0924   WBC 3.6*  --   --  5.9   HGB 13.6  --   --  14.3   MCV 80  --   --  80     --   --  221     --   --  138   POTASSIUM 4.0  --   --  4.1   CHLORIDE 109  --   --  105   CO2 27  --   --  31   BUN 9  --   --  12   CR 0.78  --   --  0.86   ANIONGAP 6  --   --  2*   TIMA 8.4*  --   --  9.1   GLC 89  --   --  96   ALBUMIN  --   --   --  3.6   PROTTOTAL  --   --   --  6.9   BILITOTAL  --   --   --  0.3   ALKPHOS  --   --   --  83   ALT  --   --   --  30   AST  --   --   --  22   LIPASE  --   --   --  240   TROPI <0.015 <0.015 <0.015 <0.015       Recent Results (from the past 24 hour(s))   CT Abdomen Pelvis w/o Contrast    Narrative    CT ABDOMEN PELVIS WITHOUT CONTRAST   12/5/2019 7:15 PM     HISTORY: Abdominal pain, acute, generalized.    TECHNIQUE: Noncontrast CT abdomen and pelvis was performed. Radiation  dose for this scan was reduced using automated exposure control,  adjustment of the mA and/or kV according to patient size, or iterative  reconstruction technique.    COMPARISON: CT abdomen and pelvis on 12/5/2005.    FINDINGS:   Lung bases: Minimal bibasilar atelectasis.    Abdomen/pelvis: Limited evaluation of the abdominal organs due to lack  of IV contrast. The unenhanced  liver, gallbladder, spleen, adrenal  glands, pancreas and kidneys are unremarkable.    Postsurgical changes of partial colectomy. No abnormally dilated bowel  loops. No significant free fluid in the abdomen or pelvis. No free  peritoneal or portal venous gas.    Bones and soft tissues: No suspicious osseous lesion. Mild diffuse fat  stranding of the subcutaneous tissue of the abdomen, of indeterminate  etiology.      Impression    IMPRESSION: No acute pathology in the abdomen or pelvis.     DEMARIO CARRENO MD
